# Patient Record
Sex: MALE | Race: OTHER | Employment: FULL TIME | URBAN - METROPOLITAN AREA
[De-identification: names, ages, dates, MRNs, and addresses within clinical notes are randomized per-mention and may not be internally consistent; named-entity substitution may affect disease eponyms.]

---

## 2017-05-02 ENCOUNTER — HOSPITAL ENCOUNTER (INPATIENT)
Age: 47
LOS: 1 days | Discharge: HOME | DRG: 103 | End: 2017-05-03
Attending: EMERGENCY MEDICINE | Admitting: HOSPITALIST
Payer: COMMERCIAL

## 2017-05-02 ENCOUNTER — APPOINTMENT (OUTPATIENT)
Dept: CT IMAGING | Age: 47
DRG: 103 | End: 2017-05-02
Attending: EMERGENCY MEDICINE
Payer: COMMERCIAL

## 2017-05-02 DIAGNOSIS — R29.898 LEFT ARM WEAKNESS: ICD-10-CM

## 2017-05-02 DIAGNOSIS — R51.9 HEADACHE, UNSPECIFIED HEADACHE TYPE: Primary | ICD-10-CM

## 2017-05-02 LAB
ALBUMIN SERPL BCP-MCNC: 4.2 G/DL (ref 3.5–5)
ALBUMIN/GLOB SERPL: 1.1 {RATIO} (ref 1.2–3.5)
ALP SERPL-CCNC: 92 U/L (ref 50–136)
ALT SERPL-CCNC: 53 U/L (ref 12–65)
ANION GAP BLD CALC-SCNC: 10 MMOL/L (ref 7–16)
AST SERPL W P-5'-P-CCNC: 35 U/L (ref 15–37)
BASOPHILS # BLD AUTO: 0.1 K/UL (ref 0–0.2)
BASOPHILS # BLD: 2 % (ref 0–2)
BILIRUB SERPL-MCNC: 0.7 MG/DL (ref 0.2–1.1)
BUN SERPL-MCNC: 16 MG/DL (ref 6–23)
CALCIUM SERPL-MCNC: 9 MG/DL (ref 8.3–10.4)
CHLORIDE SERPL-SCNC: 104 MMOL/L (ref 98–107)
CO2 SERPL-SCNC: 29 MMOL/L (ref 21–32)
CREAT SERPL-MCNC: 0.96 MG/DL (ref 0.8–1.5)
DIFFERENTIAL METHOD BLD: ABNORMAL
EOSINOPHIL # BLD: 0.2 K/UL (ref 0–0.8)
EOSINOPHIL NFR BLD: 2 % (ref 0.5–7.8)
ERYTHROCYTE [DISTWIDTH] IN BLOOD BY AUTOMATED COUNT: 12.8 % (ref 11.9–14.6)
ETHANOL SERPL-MCNC: <3 MG/DL
GLOBULIN SER CALC-MCNC: 3.7 G/DL (ref 2.3–3.5)
GLUCOSE SERPL-MCNC: 93 MG/DL (ref 65–100)
HCT VFR BLD AUTO: 44.6 % (ref 41.1–50.3)
HGB BLD-MCNC: 15.8 G/DL (ref 13.6–17.2)
IMM GRANULOCYTES # BLD: 0.1 K/UL (ref 0–0.5)
IMM GRANULOCYTES NFR BLD AUTO: 0.6 % (ref 0–5)
LYMPHOCYTES # BLD AUTO: 52 % (ref 13–44)
LYMPHOCYTES # BLD: 4.2 K/UL (ref 0.5–4.6)
MCH RBC QN AUTO: 30.7 PG (ref 26.1–32.9)
MCHC RBC AUTO-ENTMCNC: 35.4 G/DL (ref 31.4–35)
MCV RBC AUTO: 86.6 FL (ref 79.6–97.8)
MONOCYTES # BLD: 0.5 K/UL (ref 0.1–1.3)
MONOCYTES NFR BLD AUTO: 7 % (ref 4–12)
NEUTS SEG # BLD: 2.9 K/UL (ref 1.7–8.2)
NEUTS SEG NFR BLD AUTO: 36 % (ref 43–78)
PLATELET # BLD AUTO: 281 K/UL (ref 150–450)
PMV BLD AUTO: 9.5 FL (ref 10.8–14.1)
POTASSIUM SERPL-SCNC: 3.6 MMOL/L (ref 3.5–5.1)
PROT SERPL-MCNC: 7.9 G/DL (ref 6.3–8.2)
RBC # BLD AUTO: 5.15 M/UL (ref 4.23–5.67)
SODIUM SERPL-SCNC: 143 MMOL/L (ref 136–145)
TROPONIN I SERPL-MCNC: <0.02 NG/ML (ref 0.02–0.05)
WBC # BLD AUTO: 7.9 K/UL (ref 4.3–11.1)

## 2017-05-02 PROCEDURE — 74011250636 HC RX REV CODE- 250/636: Performed by: EMERGENCY MEDICINE

## 2017-05-02 PROCEDURE — 80307 DRUG TEST PRSMV CHEM ANLYZR: CPT | Performed by: EMERGENCY MEDICINE

## 2017-05-02 PROCEDURE — 81003 URINALYSIS AUTO W/O SCOPE: CPT | Performed by: EMERGENCY MEDICINE

## 2017-05-02 PROCEDURE — 96361 HYDRATE IV INFUSION ADD-ON: CPT | Performed by: EMERGENCY MEDICINE

## 2017-05-02 PROCEDURE — 85025 COMPLETE CBC W/AUTO DIFF WBC: CPT | Performed by: EMERGENCY MEDICINE

## 2017-05-02 PROCEDURE — 84484 ASSAY OF TROPONIN QUANT: CPT | Performed by: EMERGENCY MEDICINE

## 2017-05-02 PROCEDURE — 93005 ELECTROCARDIOGRAM TRACING: CPT | Performed by: EMERGENCY MEDICINE

## 2017-05-02 PROCEDURE — 70450 CT HEAD/BRAIN W/O DYE: CPT

## 2017-05-02 PROCEDURE — 80053 COMPREHEN METABOLIC PANEL: CPT | Performed by: EMERGENCY MEDICINE

## 2017-05-02 PROCEDURE — 96374 THER/PROPH/DIAG INJ IV PUSH: CPT | Performed by: EMERGENCY MEDICINE

## 2017-05-02 PROCEDURE — 99284 EMERGENCY DEPT VISIT MOD MDM: CPT | Performed by: EMERGENCY MEDICINE

## 2017-05-02 RX ORDER — HYDROMORPHONE HYDROCHLORIDE 1 MG/ML
0.5 INJECTION, SOLUTION INTRAMUSCULAR; INTRAVENOUS; SUBCUTANEOUS
Status: COMPLETED | OUTPATIENT
Start: 2017-05-02 | End: 2017-05-02

## 2017-05-02 RX ADMIN — HYDROMORPHONE HYDROCHLORIDE 0.5 MG: 1 INJECTION, SOLUTION INTRAMUSCULAR; INTRAVENOUS; SUBCUTANEOUS at 22:20

## 2017-05-02 RX ADMIN — SODIUM CHLORIDE 1000 ML: 900 INJECTION, SOLUTION INTRAVENOUS at 22:20

## 2017-05-02 NOTE — IP AVS SNAPSHOT
Current Discharge Medication List  
  
START taking these medications Dose & Instructions Dispensing Information Comments Morning Noon Evening Bedtime  
 atorvastatin 40 mg tablet Commonly known as:  LIPITOR Your next dose is:  5/3 Dose:  40 mg Take 1 Tab by mouth nightly. Quantity:  30 Tab Refills:  1  
     
   
   
   
  
 levothyroxine 50 mcg tablet Commonly known as:  SYNTHROID Start taking on:  5/4/2017 Dose:  50 mcg Take 1 Tab by mouth Daily (before breakfast). Quantity:  30 Tab Refills:  1  
     
  
   
   
   
  
 metaxalone 800 mg tablet Commonly known as:  SKELAXIN Dose:  800 mg Take 1 Tab by mouth three (3) times daily as needed for up to 10 days. Indications: MUSCLE SPASM Quantity:  60 Tab Refills:  1 CONTINUE these medications which have NOT CHANGED Dose & Instructions Dispensing Information Comments Morning Noon Evening Bedtime  
 ibuprofen 100 mg tablet Dose:  100 mg Take 100 mg by mouth every six (6) hours as needed for Pain. Refills:  0  
     
   
   
   
  
 TYLENOL 325 mg tablet Generic drug:  acetaminophen Dose:  325 mg Take 325 mg by mouth every four (4) hours as needed for Pain. Refills:  0 Where to Get Your Medications Information on where to get these meds will be given to you by the nurse or doctor. ! Ask your nurse or doctor about these medications  
  atorvastatin 40 mg tablet  
 levothyroxine 50 mcg tablet  
 metaxalone 800 mg tablet

## 2017-05-02 NOTE — IP AVS SNAPSHOT
[address] 
[phone] Patient: [facility] MRN: [id number]:[id number] You are allergic to the following No active allergies Recent Documentation Height Weight BMI  
  
  
 1.626 m 79.4 kg 30.04 kg/m2 Emergency Contacts Name Discharge Info Relation Home Work Mobile [facility]   [phone] About your hospitalization You were admitted on:  May 3, 2017 You last received care in the:  [facility] 8 MED SURG You were discharged on:  May 3, 2017 Unit phone number:  [phone] Why you were hospitalized Your primary diagnosis was:  Headache Your diagnoses also included:  Blurred Vision, Bilateral, Neck Pain Providers Seen During Your Hospitalizations Provider Role Specialty Primary office phone Jackie Wang MD Attending Provider Emergency Medicine [phone] Samuel Tellez MD Attending Provider Internal Medicine [phone] Your Primary Care Physician (PCP) Primary Care Physician Office Phone Office Fax NONE ** None ** ** None ** Follow-up Information Follow up With Details Comments Contact Info Cyrus Favre, NP On 5/17/2017 May 17 at 10:10am [address] [facility] [address] [facility] [address] [phone] None   None ([phone]) Patient stated that they have no PCP Your Appointments Wednesday May 17, 2017 10:40 AM EDT New Patient with Cyrus Favre, NP  
[address] [place] ([address]) [address] [place] [address]  
[phone] Current Discharge Medication List  
  
START taking these medications Dose & Instructions Dispensing Information Comments Morning Noon Evening Bedtime  
 atorvastatin 40 mg tablet Commonly known as:  LIPITOR Your next dose is:  5/3  Dose:  40 mg  
 Take 1 Tab by mouth nightly. Quantity:  30 Tab Refills:  1  
     
   
   
   
  
 levothyroxine 50 mcg tablet Commonly known as:  SYNTHROID Start taking on:  5/4/2017 Dose:  50 mcg Take 1 Tab by mouth Daily (before breakfast). Quantity:  30 Tab Refills:  1  
     
  
   
   
   
  
 metaxalone 800 mg tablet Commonly known as:  SKELAXIN Dose:  800 mg Take 1 Tab by mouth three (3) times daily as needed for up to 10 days. Indications: MUSCLE SPASM Quantity:  60 Tab Refills:  1 CONTINUE these medications which have NOT CHANGED Dose & Instructions Dispensing Information Comments Morning Noon Evening Bedtime  
 ibuprofen 100 mg tablet Dose:  100 mg Take 100 mg by mouth every six (6) hours as needed for Pain. Refills:  0  
     
   
   
   
  
 TYLENOL 325 mg tablet Generic drug:  acetaminophen Dose:  325 mg Take 325 mg by mouth every four (4) hours as needed for Pain. Refills:  0 Where to Get Your Medications Information on where to get these meds will be given to you by the nurse or doctor. ! Ask your nurse or doctor about these medications  
  atorvastatin 40 mg tablet  
 levothyroxine 50 mcg tablet  
 metaxalone 800 mg tablet Discharge Instructions DISCHARGE SUMMARY from Nurse The following personal items are in your possession at time of discharge: 
 
Dental Appliances: None Visual Aid: None (bifocals at home that do not help) Home Medications: None Jewelry: Watch Clothing: Pants, Shirt, Footwear, Socks, Undergarments Other Valuables: Cell Phone, Seretha Gone (2 cell phones) PATIENT INSTRUCTIONS: 
 
 
F-face looks uneven A-arms unable to move or move unevenly S-speech slurred or non-existent T-time-call 911 as soon as signs and symptoms begin-DO NOT go Back to bed or wait to see if you get better-TIME IS BRAIN. Warning Signs of HEART ATTACK Call 911 if you have these symptoms: 
? Chest discomfort. Most heart attacks involve discomfort in the center of the chest that lasts more than a few minutes, or that goes away and comes back. It can feel like uncomfortable pressure, squeezing, fullness, or pain. ? Discomfort in other areas of the upper body. Symptoms can include pain or discomfort in one or both arms, the back, neck, jaw, or stomach. ? Shortness of breath with or without chest discomfort. ? Other signs may include breaking out in a cold sweat, nausea, or lightheadedness. Don't wait more than five minutes to call [address]! Fast action can save your life. Calling 911 is almost always the fastest way to get lifesaving treatment. Emergency Medical Services staff can begin treatment when they arrive  up to an hour sooner than if someone gets to the hospital by car. The discharge information has been reviewed with the patient. The patient verbalized understanding. Discharge medications reviewed with the patient and appropriate educational materials and side effects teaching were provided. Back Pain: Care Instructions Your Care Instructions Back pain has many possible causes. It is often related to problems with muscles and ligaments of the back. It may also be related to problems with the nerves, discs, or bones of the back. Moving, lifting, standing, sitting, or sleeping in an awkward way can strain the back. Sometimes you don't notice the injury until later. Arthritis is another common cause of back pain.  
Although it may hurt a lot, back pain usually improves on its own within several weeks. Most people recover in 12 weeks or less. Using good home treatment and being careful not to stress your back can help you feel better sooner. Follow-up care is a key part of your treatment and safety. Be sure to make and go to all appointments, and call your doctor if you are having problems. Its also a good idea to know your test results and keep a list of the medicines you take. How can you care for yourself at home? · Sit or lie in positions that are most comfortable and reduce your pain. Try one of these positions when you lie down: ¨ Lie on your back with your knees bent and supported by large pillows. ¨ Lie on the floor with your legs on the seat of a sofa or chair. Donnise Falter on your side with your knees and hips bent and a pillow between your legs. ¨ Lie on your stomach if it does not make pain worse. · Do not sit up in bed, and avoid soft couches and twisted positions. Bed rest can help relieve pain at first, but it delays healing. Avoid bed rest after the first day of back pain. · Change positions every 30 minutes. If you must sit for long periods of time, take breaks from sitting. Get up and walk around, or lie in a comfortable position. · Try using a heating pad on a low or medium setting for 15 to 20 minutes every 2 or 3 hours. Try a warm shower in place of one session with the heating pad. · You can also try an ice pack for 10 to 15 minutes every 2 to 3 hours. Put a thin cloth between the ice pack and your skin. · Take pain medicines exactly as directed. ¨ If the doctor gave you a prescription medicine for pain, take it as prescribed. ¨ If you are not taking a prescription pain medicine, ask your doctor if you can take an over-the-counter medicine. · Take short walks several times a day. You can start with 5 to 10 minutes, 3 or 4 times a day, and work up to longer walks. Walk on level surfaces and avoid hills and stairs until your back is better. · Return to work and other activities as soon as you can. Continued rest without activity is usually not good for your back. · To prevent future back pain, do exercises to stretch and strengthen your back and stomach. Learn how to use good posture, safe lifting techniques, and proper body mechanics. When should you call for help? Call your doctor now or seek immediate medical care if: 
· You have new or worsening numbness in your legs. · You have new or worsening weakness in your legs. (This could make it hard to stand up.) · You lose control of your bladder or bowels. Watch closely for changes in your health, and be sure to contact your doctor if: 
· Your pain gets worse. · You are not getting better after 2 weeks. Where can you learn more? Go to http://sahra-shekhar.info/. Enter H788 in the search box to learn more about \"Back Pain: Care Instructions. \" Current as of: May 23, 2016 Content Version: 11.2 © 1238-9886 [employer]. Care instructions adapted under license by [employer] (which disclaims liability or warranty for this information). If you have questions about a medical condition or this instruction, always ask your healthcare professional. [address] any warranty or liability for your use of this information. Hypothyroidism: Care Instructions Your Care Instructions You have hypothyroidism, which means that your body is not making enough thyroid hormone. This hormone helps your body use energy. If your thyroid level is low, you may feel tired, be constipated, have an increase in your blood pressure, or have dry skin or memory problems. You may also get cold easily, even when it is warm. Women with low thyroid levels may have heavy menstrual periods. A blood test to find your thyroid-stimulating hormone (TSH) level is used to check for hypothyroidism.  A high TSH level may mean that you have low thyroid. When your body is not making enough thyroid hormone, TSH levels rise in an effort to make the body produce more. The treatment for hypothyroidism is to take thyroid hormone pills. You should start to feel better in 1 to 2 weeks. But it can take several months to see changes in the TSH level. You will need regular visits with your doctor to make sure you have the right dose of medicine. Most people need treatment for the rest of their lives. You will need to see your doctor regularly to have blood tests and to make sure you are doing well. Follow-up care is a key part of your treatment and safety. Be sure to make and go to all appointments, and call your doctor if you are having problems. Its also a good idea to know your test results and keep a list of the medicines you take. How can you care for yourself at home? · Take your thyroid hormone medicine exactly as prescribed. Call your doctor if you think you are having a problem with your medicine. Most people do not have side effects if they take the right amount of medicine regularly. ¨ Take the medicine 30 minutes before breakfast, and do not take it with calcium, vitamins, or iron. ¨ Do not take extra doses of your thyroid medicine. It will not help you get better any faster, and it may cause side effects. ¨ If you forget to take a dose, do NOT take a double dose of medicine. Take your usual dose the next day. · Tell your doctor about all prescription, herbal, or over-the-counter products you take. · Take care of yourself. Eat a healthy diet, get enough sleep, and get regular exercise. When should you call for help? Call 911 anytime you think you may need emergency care. For example, call if: 
· You passed out (lost consciousness). · You have severe trouble breathing. · You have a very slow heartbeat (less than 60 beats a minute). · You have a low body temperature (95°F or below). Call your doctor now or seek immediate medical care if: · You feel tired, sluggish, or weak. · You have trouble remembering things or concentrating. · You do not begin to feel better 2 weeks after starting your medicine. Watch closely for changes in your health, and be sure to contact your doctor if you have any problems. Where can you learn more? Go to http://sahra-shekhar.info/. Enter A367 in the search box to learn more about \"Hypothyroidism: Care Instructions. \" Current as of: July 28, 2016 Content Version: 11.2 © 6538-8304 [employer]. Care instructions adapted under license by [employer] (which disclaims liability or warranty for this information). If you have questions about a medical condition or this instruction, always ask your healthcare professional. [address] any warranty or liability for your use of this information. Discharge Orders None [employer] Announcement We are excited to announce that we are making your provider's discharge notes available to you in [employer]. You will see these notes when they are completed and signed by the physician that discharged you from your recent hospital stay. If you have any questions or concerns about any information you see in [employer], please call the Health Information Department where you were seen or reach out to your Primary Care Provider for more information about your plan of care. Introducing [facility]! [employer] introduces [employer] patient portal. Now you can access parts of your medical record, email your doctor's office, and request medication refills online. 1. In your internet browser, go to [email or web address] 2. Click on the First Time User? Click Here link in the Sign In box. You will see the New Member Sign Up page. 3. Enter your [employer] Access Code exactly as it appears below. You will not need to use this code after youve completed the sign-up process.  If you do not sign up before the expiration date, you must request a new code. · [employer] Access Code: [email or web address] Expires: 7/31/2017  8:27 PM 
 
4. Enter the last four digits of your Social Security Number (xxxx) and Date of Birth (mm/dd/yyyy) as indicated and click Submit. You will be taken to the next sign-up page. 5. Create a [employer] ID. This will be your [employer] login ID and cannot be changed, so think of one that is secure and easy to remember. 6. Create a [employer] password. You can change your password at any time. 7. Enter your Password Reset Question and Answer. This can be used at a later time if you forget your password. 8. Enter your e-mail address. You will receive e-mail notification when new information is available in [address] 9. Click Sign Up. You can now view and download portions of your medical record. 10. Click the Download Summary menu link to download a portable copy of your medical information. If you have questions, please visit the Frequently Asked Questions section of the [employer] website. Remember, [employer] is NOT to be used for urgent needs. For medical emergencies, dial 911. Now available from your iPhone and Android! General Information Please provide this summary of care documentation to your next provider. Patient Signature:  ____________________________________________________________ Date:  ____________________________________________________________  
  
Yanet Burn Provider Signature:  ____________________________________________________________ Date:  ____________________________________________________________

## 2017-05-03 ENCOUNTER — APPOINTMENT (OUTPATIENT)
Dept: GENERAL RADIOLOGY | Age: 47
DRG: 103 | End: 2017-05-03
Attending: HOSPITALIST
Payer: COMMERCIAL

## 2017-05-03 ENCOUNTER — APPOINTMENT (OUTPATIENT)
Dept: CT IMAGING | Age: 47
DRG: 103 | End: 2017-05-03
Attending: HOSPITALIST
Payer: COMMERCIAL

## 2017-05-03 ENCOUNTER — APPOINTMENT (OUTPATIENT)
Dept: MRI IMAGING | Age: 47
DRG: 103 | End: 2017-05-03
Attending: HOSPITALIST
Payer: COMMERCIAL

## 2017-05-03 VITALS
TEMPERATURE: 98.1 F | BODY MASS INDEX: 29.88 KG/M2 | SYSTOLIC BLOOD PRESSURE: 123 MMHG | HEIGHT: 64 IN | DIASTOLIC BLOOD PRESSURE: 77 MMHG | RESPIRATION RATE: 18 BRPM | HEART RATE: 57 BPM | OXYGEN SATURATION: 98 % | WEIGHT: 175 LBS

## 2017-05-03 PROBLEM — M54.2 NECK PAIN: Status: ACTIVE | Noted: 2017-05-03

## 2017-05-03 PROBLEM — H53.8 BLURRED VISION, BILATERAL: Status: ACTIVE | Noted: 2017-05-03

## 2017-05-03 PROBLEM — R51.9 HEADACHE: Status: ACTIVE | Noted: 2017-05-03

## 2017-05-03 LAB
AMPHET UR QL SCN: NEGATIVE
ATRIAL RATE: 59 BPM
BARBITURATES UR QL SCN: NEGATIVE
BENZODIAZ UR QL: NEGATIVE
CALCULATED P AXIS, ECG09: 55 DEGREES
CALCULATED R AXIS, ECG10: 66 DEGREES
CALCULATED T AXIS, ECG11: 44 DEGREES
CANNABINOIDS UR QL SCN: NEGATIVE
CHOLEST SERPL-MCNC: 199 MG/DL
COCAINE UR QL SCN: NEGATIVE
DIAGNOSIS, 93000: NORMAL
ERYTHROCYTE [SEDIMENTATION RATE] IN BLOOD: 4 MM/HR (ref 0–15)
EST. AVERAGE GLUCOSE BLD GHB EST-MCNC: 117 MG/DL
HBA1C MFR BLD: 5.7 % (ref 4.8–6)
HDLC SERPL-MCNC: 32 MG/DL (ref 40–60)
HDLC SERPL: 6.2 {RATIO}
HIV1 P24 AG SERPL QL IA: NONREACTIVE
HIV1+2 AB SERPL QL IA: NONREACTIVE
LDLC SERPL CALC-MCNC: 136.4 MG/DL
LIPID PROFILE,FLP: ABNORMAL
MAGNESIUM SERPL-MCNC: 2.1 MG/DL (ref 1.8–2.4)
METHADONE UR QL: NEGATIVE
OPIATES UR QL: POSITIVE
P-R INTERVAL, ECG05: 160 MS
PCP UR QL: NEGATIVE
Q-T INTERVAL, ECG07: 388 MS
QRS DURATION, ECG06: 94 MS
QTC CALCULATION (BEZET), ECG08: 384 MS
T4 FREE SERPL-MCNC: 0.6 NG/DL (ref 0.78–1.46)
TRIGL SERPL-MCNC: 153 MG/DL (ref 35–150)
TSH SERPL DL<=0.005 MIU/L-ACNC: 27.4 UIU/ML (ref 0.36–3.74)
VENTRICULAR RATE, ECG03: 59 BPM
VIT B12 SERPL-MCNC: 430 PG/ML (ref 193–986)
VLDLC SERPL CALC-MCNC: 30.6 MG/DL (ref 6–23)

## 2017-05-03 PROCEDURE — 99218 HC RM OBSERVATION: CPT

## 2017-05-03 PROCEDURE — 70553 MRI BRAIN STEM W/O & W/DYE: CPT

## 2017-05-03 PROCEDURE — 84443 ASSAY THYROID STIM HORMONE: CPT | Performed by: HOSPITALIST

## 2017-05-03 PROCEDURE — 74011636320 HC RX REV CODE- 636/320: Performed by: HOSPITALIST

## 2017-05-03 PROCEDURE — 74011250637 HC RX REV CODE- 250/637: Performed by: HOSPITALIST

## 2017-05-03 PROCEDURE — 82607 VITAMIN B-12: CPT | Performed by: HOSPITALIST

## 2017-05-03 PROCEDURE — 65270000029 HC RM PRIVATE

## 2017-05-03 PROCEDURE — 36415 COLL VENOUS BLD VENIPUNCTURE: CPT | Performed by: HOSPITALIST

## 2017-05-03 PROCEDURE — 97530 THERAPEUTIC ACTIVITIES: CPT

## 2017-05-03 PROCEDURE — 84439 ASSAY OF FREE THYROXINE: CPT | Performed by: INTERNAL MEDICINE

## 2017-05-03 PROCEDURE — 74011000258 HC RX REV CODE- 258: Performed by: HOSPITALIST

## 2017-05-03 PROCEDURE — 87389 HIV-1 AG W/HIV-1&-2 AB AG IA: CPT | Performed by: HOSPITALIST

## 2017-05-03 PROCEDURE — 85652 RBC SED RATE AUTOMATED: CPT | Performed by: HOSPITALIST

## 2017-05-03 PROCEDURE — 80061 LIPID PANEL: CPT | Performed by: HOSPITALIST

## 2017-05-03 PROCEDURE — 83036 HEMOGLOBIN GLYCOSYLATED A1C: CPT | Performed by: HOSPITALIST

## 2017-05-03 PROCEDURE — 74011250636 HC RX REV CODE- 250/636: Performed by: HOSPITALIST

## 2017-05-03 PROCEDURE — 72050 X-RAY EXAM NECK SPINE 4/5VWS: CPT

## 2017-05-03 PROCEDURE — 97161 PT EVAL LOW COMPLEX 20 MIN: CPT

## 2017-05-03 PROCEDURE — 83735 ASSAY OF MAGNESIUM: CPT | Performed by: HOSPITALIST

## 2017-05-03 PROCEDURE — 70496 CT ANGIOGRAPHY HEAD: CPT

## 2017-05-03 PROCEDURE — A9577 INJ MULTIHANCE: HCPCS | Performed by: HOSPITALIST

## 2017-05-03 RX ORDER — METAXALONE 800 MG/1
800 TABLET ORAL
Qty: 60 TAB | Refills: 1 | Status: SHIPPED | OUTPATIENT
Start: 2017-05-03 | End: 2017-05-13

## 2017-05-03 RX ORDER — SODIUM CHLORIDE 9 MG/ML
100 INJECTION, SOLUTION INTRAVENOUS CONTINUOUS
Status: DISPENSED | OUTPATIENT
Start: 2017-05-03 | End: 2017-05-03

## 2017-05-03 RX ORDER — SODIUM CHLORIDE 0.9 % (FLUSH) 0.9 %
10 SYRINGE (ML) INJECTION
Status: COMPLETED | OUTPATIENT
Start: 2017-05-03 | End: 2017-05-03

## 2017-05-03 RX ORDER — ATORVASTATIN CALCIUM 40 MG/1
40 TABLET, FILM COATED ORAL
Qty: 30 TAB | Refills: 1 | Status: SHIPPED | OUTPATIENT
Start: 2017-05-03 | End: 2017-06-07

## 2017-05-03 RX ORDER — BISACODYL 5 MG
5 TABLET, DELAYED RELEASE (ENTERIC COATED) ORAL DAILY PRN
Status: DISCONTINUED | OUTPATIENT
Start: 2017-05-03 | End: 2017-05-03 | Stop reason: HOSPADM

## 2017-05-03 RX ORDER — ATORVASTATIN CALCIUM 40 MG/1
40 TABLET, FILM COATED ORAL
Status: DISCONTINUED | OUTPATIENT
Start: 2017-05-03 | End: 2017-05-03 | Stop reason: HOSPADM

## 2017-05-03 RX ORDER — ONDANSETRON 2 MG/ML
4 INJECTION INTRAMUSCULAR; INTRAVENOUS
Status: DISCONTINUED | OUTPATIENT
Start: 2017-05-03 | End: 2017-05-03 | Stop reason: HOSPADM

## 2017-05-03 RX ORDER — ENOXAPARIN SODIUM 100 MG/ML
40 INJECTION SUBCUTANEOUS EVERY 24 HOURS
Status: DISCONTINUED | OUTPATIENT
Start: 2017-05-03 | End: 2017-05-03 | Stop reason: HOSPADM

## 2017-05-03 RX ORDER — KETOROLAC TROMETHAMINE 15 MG/ML
15 INJECTION, SOLUTION INTRAMUSCULAR; INTRAVENOUS
Status: DISCONTINUED | OUTPATIENT
Start: 2017-05-03 | End: 2017-05-03 | Stop reason: HOSPADM

## 2017-05-03 RX ORDER — ASPIRIN 325 MG
325 TABLET ORAL DAILY
Status: DISCONTINUED | OUTPATIENT
Start: 2017-05-03 | End: 2017-05-03 | Stop reason: HOSPADM

## 2017-05-03 RX ORDER — ENOXAPARIN SODIUM 100 MG/ML
40 INJECTION SUBCUTANEOUS EVERY 24 HOURS
Status: DISCONTINUED | OUTPATIENT
Start: 2017-05-03 | End: 2017-05-03 | Stop reason: SDUPTHER

## 2017-05-03 RX ORDER — ACETAMINOPHEN 325 MG/1
650 TABLET ORAL
Status: DISCONTINUED | OUTPATIENT
Start: 2017-05-03 | End: 2017-05-03 | Stop reason: HOSPADM

## 2017-05-03 RX ORDER — SODIUM CHLORIDE 0.9 % (FLUSH) 0.9 %
10 SYRINGE (ML) INJECTION
Status: DISCONTINUED | OUTPATIENT
Start: 2017-05-03 | End: 2017-05-03 | Stop reason: HOSPADM

## 2017-05-03 RX ORDER — ACETAMINOPHEN 325 MG/1
325 TABLET ORAL
COMMUNITY
End: 2017-06-19

## 2017-05-03 RX ORDER — LEVOTHYROXINE SODIUM 50 UG/1
50 TABLET ORAL
Qty: 30 TAB | Refills: 1 | Status: SHIPPED | OUTPATIENT
Start: 2017-05-04 | End: 2017-05-18 | Stop reason: DRUGHIGH

## 2017-05-03 RX ORDER — METAXALONE 800 MG/1
800 TABLET ORAL
Status: DISCONTINUED | OUTPATIENT
Start: 2017-05-03 | End: 2017-05-03 | Stop reason: HOSPADM

## 2017-05-03 RX ORDER — LEVOTHYROXINE SODIUM 50 UG/1
50 TABLET ORAL
Status: DISCONTINUED | OUTPATIENT
Start: 2017-05-04 | End: 2017-05-03 | Stop reason: HOSPADM

## 2017-05-03 RX ORDER — SODIUM CHLORIDE 0.9 % (FLUSH) 0.9 %
5-10 SYRINGE (ML) INJECTION AS NEEDED
Status: DISCONTINUED | OUTPATIENT
Start: 2017-05-03 | End: 2017-05-03 | Stop reason: HOSPADM

## 2017-05-03 RX ORDER — SODIUM CHLORIDE 0.9 % (FLUSH) 0.9 %
5-10 SYRINGE (ML) INJECTION EVERY 8 HOURS
Status: DISCONTINUED | OUTPATIENT
Start: 2017-05-03 | End: 2017-05-03 | Stop reason: HOSPADM

## 2017-05-03 RX ADMIN — GADOBENATE DIMEGLUMINE 16 ML: 529 INJECTION, SOLUTION INTRAVENOUS at 14:43

## 2017-05-03 RX ADMIN — Medication 5 ML: at 16:26

## 2017-05-03 RX ADMIN — ENOXAPARIN SODIUM 40 MG: 40 INJECTION SUBCUTANEOUS at 08:50

## 2017-05-03 RX ADMIN — IOPAMIDOL 80 ML: 755 INJECTION, SOLUTION INTRAVENOUS at 14:53

## 2017-05-03 RX ADMIN — ASPIRIN 325 MG ORAL TABLET 325 MG: 325 PILL ORAL at 08:50

## 2017-05-03 RX ADMIN — KETOROLAC TROMETHAMINE 15 MG: 15 INJECTION, SOLUTION INTRAMUSCULAR; INTRAVENOUS at 03:23

## 2017-05-03 RX ADMIN — Medication 10 ML: at 14:53

## 2017-05-03 RX ADMIN — METAXALONE 800 MG: 800 TABLET ORAL at 03:23

## 2017-05-03 RX ADMIN — Medication 5 ML: at 03:24

## 2017-05-03 RX ADMIN — SODIUM CHLORIDE 100 ML: 900 INJECTION, SOLUTION INTRAVENOUS at 14:53

## 2017-05-03 RX ADMIN — SODIUM CHLORIDE 100 ML/HR: 900 INJECTION, SOLUTION INTRAVENOUS at 03:27

## 2017-05-03 RX ADMIN — ATORVASTATIN CALCIUM 40 MG: 40 TABLET, FILM COATED ORAL at 03:23

## 2017-05-03 RX ADMIN — SODIUM CHLORIDE 100 ML/HR: 900 INJECTION, SOLUTION INTRAVENOUS at 12:06

## 2017-05-03 NOTE — H&P
HOSPITALIST H&P/CONSULT  NAME:  Deejay Jasmine [facility]   Age:  49 y.o.  :   1970   MRN:   [id number]  PCP: None  Treatment Team: Attending Provider: Howard Kilpatrick MD; Primary Nurse: Alice Maldonado, MICHAEL    No Order     CC: Reason for admission is: headache and blurred vision    HPI:   Patient case was reviewed with the ER provider prior to seeing the patient. Patient is a 49 y.o. male who presents to the ER with blurred vision headache and left arm weakness.  present. No recent injury but does have previous head trauma at work when fell and hit head without a hard hat. He had neurosurgical consultation at that time and no acute pathology. Denies syncope, chest pain, palpitations, F/C. Has had left shoulder surgery before. No recent heavy lifting. States this HA is worse than ones he has had in the past but denies worst HA ever. No hx TIA/CVA or seizures. ROS:  All systems have been reviewed and are negative except as stated in HPI or elsewhere. No past medical history on file. No past surgical history on file. Social History   Substance Use Topics    Smoking status: Not on file    Smokeless tobacco: Not on file    Alcohol use Not on file      No family history on file. FH Reviewed and non-contributory to admitting diagnosis    No Known Allergies   None         Objective:     Visit Vitals    /81    Pulse (!) 56    Temp 98.4 °F (36.9 °C)    Resp 12    SpO2 95%      Temp (24hrs), Av.4 °F (36.9 °C), Min:98.4 °F (36.9 °C), Max:98.4 °F (36.9 °C)    Oxygen Therapy  O2 Sat (%): 95 % (17 0000)  Pulse via Oximetry: 56 beats per minute (17 0000)  O2 Device: Room air (17)   There is no height or weight on file to calculate BMI. Physical Exam:    General:    WD and WN , No apparent distress. Head:   Normocephalic, without obvious abnormality, atraumatic.   Eyes:  PERRL; EOMI sclerae injected  No nystagmus  ENT:  Hearing is normal.  Oropharynx is clear with tacky mucous membranes   Resp:    Clear to auscultation bilaterally. No Wheezing or Rhonchi. Resp are even and unlabored  Heart[de-identified]  Regular rate and rhythm,  no murmur, rub or gallop. No LE edema  Abdomen:   Soft, non-tender. Not distended. Bowel sounds normal.  Musc/SK: Muscle strength and tone normal and appropriate for age and condition. No cyanosis. No clubbing. Tender left trapezius and rhomboid  Skin:     Texture, turgor normal. No significant rashes or lesions. Neurologic: CN II - XII are tested and intact; left arm diminished strength compared to right. Slow inaccurate finger to nose. Did not assess gait   Psych: Alert and oriented x 4;  Judgement and insight are normal     Data Review:   Recent Results (from the past 24 hour(s))   CBC WITH AUTOMATED DIFF    Collection Time: 05/02/17  8:46 PM   Result Value Ref Range    WBC 7.9 4.3 - 11.1 K/uL    RBC 5.15 4.23 - 5.67 M/uL    HGB 15.8 13.6 - 17.2 g/dL    HCT 44.6 41.1 - 50.3 %    MCV 86.6 79.6 - 97.8 FL    MCH 30.7 26.1 - 32.9 PG    MCHC 35.4 (H) 31.4 - 35.0 g/dL    RDW 12.8 11.9 - 14.6 %    PLATELET 098 048 - 143 K/uL    MPV 9.5 (L) 10.8 - 14.1 FL    DF AUTOMATED      NEUTROPHILS 36 (L) 43 - 78 %    LYMPHOCYTES 52 (H) 13 - 44 %    MONOCYTES 7 4.0 - 12.0 %    EOSINOPHILS 2 0.5 - 7.8 %    BASOPHILS 2 0.0 - 2.0 %    IMMATURE GRANULOCYTES 0.6 0.0 - 5.0 %    ABS. NEUTROPHILS 2.9 1.7 - 8.2 K/UL    ABS. LYMPHOCYTES 4.2 0.5 - 4.6 K/UL    ABS. MONOCYTES 0.5 0.1 - 1.3 K/UL    ABS. EOSINOPHILS 0.2 0.0 - 0.8 K/UL    ABS. BASOPHILS 0.1 0.0 - 0.2 K/UL    ABS. IMM.  GRANS. 0.1 0.0 - 0.5 K/UL   METABOLIC PANEL, COMPREHENSIVE    Collection Time: 05/02/17  8:46 PM   Result Value Ref Range    Sodium 143 136 - 145 mmol/L    Potassium 3.6 3.5 - 5.1 mmol/L    Chloride 104 98 - 107 mmol/L    CO2 29 21 - 32 mmol/L    Anion gap 10 7 - 16 mmol/L    Glucose 93 65 - 100 mg/dL    BUN 16 6 - 23 MG/DL    Creatinine 0.96 0.8 - 1.5 MG/DL    GFR est AA >60 >60 ml/min/1.73m2    GFR est non-AA >60 >60 ml/min/1.73m2    Calcium 9.0 8.3 - 10.4 MG/DL    Bilirubin, total 0.7 0.2 - 1.1 MG/DL    ALT (SGPT) 53 12 - 65 U/L    AST (SGOT) 35 15 - 37 U/L    Alk. phosphatase 92 50 - 136 U/L    Protein, total 7.9 6.3 - 8.2 g/dL    Albumin 4.2 3.5 - 5.0 g/dL    Globulin 3.7 (H) 2.3 - 3.5 g/dL    A-G Ratio 1.1 (L) 1.2 - 3.5     TROPONIN I    Collection Time: 05/02/17  8:46 PM   Result Value Ref Range    Troponin-I, Qt. <0.02 (L) 0.02 - 0.05 NG/ML   EKG, 12 LEAD, INITIAL    Collection Time: 05/02/17 10:00 PM   Result Value Ref Range    Ventricular Rate 59 BPM    Atrial Rate 59 BPM    P-R Interval 160 ms    QRS Duration 94 ms    Q-T Interval 388 ms    QTC Calculation (Bezet) 384 ms    Calculated P Axis 55 degrees    Calculated R Axis 66 degrees    Calculated T Axis 44 degrees    Diagnosis       !! AGE AND GENDER SPECIFIC ECG ANALYSIS !! Sinus bradycardia  Otherwise normal ECG     ETHYL ALCOHOL    Collection Time: 05/02/17 10:23 PM   Result Value Ref Range    ALCOHOL(ETHYL),SERUM <3 MG/DL   DRUG SCREEN, URINE    Collection Time: 05/02/17 11:42 PM   Result Value Ref Range    PCP(PHENCYCLIDINE) NEGATIVE       BENZODIAZEPINE NEGATIVE       COCAINE NEGATIVE       AMPHETAMINE NEGATIVE       METHADONE NEGATIVE       THC (TH-CANNABINOL) NEGATIVE       OPIATES POSITIVE      BARBITURATES NEGATIVE        CXR Results  (Last 48 hours)    None        CT Results  (Last 48 hours)               05/02/17 2119  CT HEAD WO CONT Final result    Impression:  Impression: Unremarkable unenhanced CT scan of the brain. Narrative:  CT head without contrast: 05/02/2017       History: Headache, blurred vision.        Technique: 5mm axial images were obtained from the skull base to the vertex   without intravenous contrast.  Radiation dose reduction techniques were used for   this study:  Our CT scanners use one or all of the following: Automated exposure   control, adjustment of the mA and/or kVp according to patient's size, iterative   reconstruction. Comparison: None       Findings: The ventricles and sulci are normal in size and configuration. There   are no extra-axial fluid collections. There is no evidence to suggest an acute   major territorial infarct. There is no evidence of acute intracranial hemorrhage   or mass effect. The bony calvarium is intact. The visualized mastoid air cells   and paranasal sinuses are well pneumatized and aerated. Assessment and Plan: Active Hospital Problems    Diagnosis Date Noted    Headache 05/03/2017    Blurred vision, bilateral 05/03/2017    Neck pain 05/03/2017         · PLAN   · 23 hr observation  · I do not feel this is a TIA or CVA but will obtain MRI imaging in am to r/o any sequelae related to a prior head trauma although neurosurgical evaluation at [facility] 2 years ago was unrevealing  · He has reproducible tenderness to left trapezius and rhomboid which could be associated with cervical disc disease. Will give a trial of toradol and skelaxin to provide symptomatic relief and obtain cervical spine MRI  · Check tsh, B12, sed rate, HIV as possible etiologies for neuropathy  · CTA head and neck  · Empiric statin for now. Check lipids and A1C  · ASA for now. PT eval  · Cont appropriate home meds (see MAR)  · Control symptoms (pain, n/v, fever, etc)  · Monitor appropriate labs   · Plans discussed with patient and/or caregiver; questions answered.     Med records reviewed         Signed By: Tex Knox MD     May 3, 2017

## 2017-05-03 NOTE — PROGRESS NOTES
TRANSFER - IN REPORT:    Verbal report received from MICHAEL North(name) on [facility]  being received from ED(unit) for routine progression of care      Report consisted of patients Situation, Background, Assessment and   Recommendations(SBAR). Information from the following report(s) SBAR, Kardex, ED Summary and MAR was reviewed with the receiving nurse. Opportunity for questions and clarification was provided. Assessment completed upon patients arrival to unit and care assumed.

## 2017-05-03 NOTE — PROGRESS NOTES
Pt. sitting up in chair. Iv fluids infusing. No acute distress noted. Is complaining of left arm tingling off and on. Will monitor.

## 2017-05-03 NOTE — ED PROVIDER NOTES
HPI Comments: Patient is a 50 yo male with headache, blurry vision and left arm weakness. Patient states pain located on the front of his head and consistent with prior headaches however worse than usual and states left arm weakness which is new (states prior pain to his left shoulder from prior surgery, however now weak for two days) and states blurry vision is new. Denies any fevers or chills, no chest pain or SOB, no nausea or vomiting, no further complaints. Patient is a 49 y.o. male presenting with headaches. The history is provided by the patient. No  was used. Headache    Associated symptoms include weakness and dizziness. Pertinent negatives include no fever, no shortness of breath, no nausea and no vomiting. No past medical history on file. No past surgical history on file. No family history on file. Social History     Social History    Marital status: SINGLE     Spouse name: N/A    Number of children: N/A    Years of education: N/A     Occupational History    Not on file. Social History Main Topics    Smoking status: Not on file    Smokeless tobacco: Not on file    Alcohol use Not on file    Drug use: Not on file    Sexual activity: Not on file     Other Topics Concern    Not on file     Social History Narrative         ALLERGIES: Review of patient's allergies indicates no known allergies. Review of Systems   Constitutional: Negative for chills and fever. HENT: Negative for rhinorrhea and sore throat. Eyes: Positive for visual disturbance. Respiratory: Negative for cough and shortness of breath. Cardiovascular: Negative for chest pain and leg swelling. Gastrointestinal: Negative for abdominal pain, diarrhea, nausea and vomiting. Genitourinary: Negative for dysuria. Musculoskeletal: Negative for back pain and neck pain. Skin: Negative for rash. Neurological: Positive for dizziness, weakness and headaches. Psychiatric/Behavioral: The patient is not nervous/anxious. Vitals:    05/02/17 2041 05/02/17 2200 05/02/17 2300   BP: (!) 148/95 (!) 159/95 127/74   Pulse: 77 62 (!) 58   Resp: 20 18 10   Temp: 98.4 °F (36.9 °C)     SpO2: 98% 96% 94%            Physical Exam   Constitutional: He is oriented to person, place, and time. He appears well-developed and well-nourished. HENT:   Head: Normocephalic. Right Ear: External ear normal.   Left Ear: External ear normal.   Eyes: Conjunctivae and EOM are normal. Pupils are equal, round, and reactive to light. Neck: Normal range of motion. Neck supple. No tracheal deviation present. Cardiovascular: Normal rate, regular rhythm, normal heart sounds and intact distal pulses. No murmur heard. Pulmonary/Chest: Effort normal and breath sounds normal. No respiratory distress. Abdominal: Soft. There is no tenderness. Musculoskeletal: Normal range of motion. Neurological: He is alert and oriented to person, place, and time. No cranial nerve deficit. Cn 2-12 appear intact. Left arm with 4/5 weakness compared to right. Extra-ocular movements intact, however difficulty with finger to nose and significant difficulty to stand unassisted and ambulate which he states is new. Skin: No rash noted. Nursing note and vitals reviewed.        MDM  Number of Diagnoses or Management Options  Headache, unspecified headache type: new and requires workup  Left arm weakness: new and requires workup     Amount and/or Complexity of Data Reviewed  Clinical lab tests: ordered and reviewed  Tests in the radiology section of CPT®: ordered and reviewed  Tests in the medicine section of CPT®: ordered and reviewed  Review and summarize past medical records: yes  Discuss the patient with other providers: yes    Risk of Complications, Morbidity, and/or Mortality  Presenting problems: high  Diagnostic procedures: high  Management options: high    Patient Progress  Patient progress: stable    ED Course       Procedures    Recent Results (from the past 12 hour(s))   CBC WITH AUTOMATED DIFF    Collection Time: 05/02/17  8:46 PM   Result Value Ref Range    WBC 7.9 4.3 - 11.1 K/uL    RBC 5.15 4.23 - 5.67 M/uL    HGB 15.8 13.6 - 17.2 g/dL    HCT 44.6 41.1 - 50.3 %    MCV 86.6 79.6 - 97.8 FL    MCH 30.7 26.1 - 32.9 PG    MCHC 35.4 (H) 31.4 - 35.0 g/dL    RDW 12.8 11.9 - 14.6 %    PLATELET 307 814 - 936 K/uL    MPV 9.5 (L) 10.8 - 14.1 FL    DF AUTOMATED      NEUTROPHILS 36 (L) 43 - 78 %    LYMPHOCYTES 52 (H) 13 - 44 %    MONOCYTES 7 4.0 - 12.0 %    EOSINOPHILS 2 0.5 - 7.8 %    BASOPHILS 2 0.0 - 2.0 %    IMMATURE GRANULOCYTES 0.6 0.0 - 5.0 %    ABS. NEUTROPHILS 2.9 1.7 - 8.2 K/UL    ABS. LYMPHOCYTES 4.2 0.5 - 4.6 K/UL    ABS. MONOCYTES 0.5 0.1 - 1.3 K/UL    ABS. EOSINOPHILS 0.2 0.0 - 0.8 K/UL    ABS. BASOPHILS 0.1 0.0 - 0.2 K/UL    ABS. IMM. GRANS. 0.1 0.0 - 0.5 K/UL   METABOLIC PANEL, COMPREHENSIVE    Collection Time: 05/02/17  8:46 PM   Result Value Ref Range    Sodium 143 136 - 145 mmol/L    Potassium 3.6 3.5 - 5.1 mmol/L    Chloride 104 98 - 107 mmol/L    CO2 29 21 - 32 mmol/L    Anion gap 10 7 - 16 mmol/L    Glucose 93 65 - 100 mg/dL    BUN 16 6 - 23 MG/DL    Creatinine 0.96 0.8 - 1.5 MG/DL    GFR est AA >60 >60 ml/min/1.73m2    GFR est non-AA >60 >60 ml/min/1.73m2    Calcium 9.0 8.3 - 10.4 MG/DL    Bilirubin, total 0.7 0.2 - 1.1 MG/DL    ALT (SGPT) 53 12 - 65 U/L    AST (SGOT) 35 15 - 37 U/L    Alk.  phosphatase 92 50 - 136 U/L    Protein, total 7.9 6.3 - 8.2 g/dL    Albumin 4.2 3.5 - 5.0 g/dL    Globulin 3.7 (H) 2.3 - 3.5 g/dL    A-G Ratio 1.1 (L) 1.2 - 3.5     TROPONIN I    Collection Time: 05/02/17  8:46 PM   Result Value Ref Range    Troponin-I, Qt. <0.02 (L) 0.02 - 0.05 NG/ML   EKG, 12 LEAD, INITIAL    Collection Time: 05/02/17 10:00 PM   Result Value Ref Range    Ventricular Rate 59 BPM    Atrial Rate 59 BPM    P-R Interval 160 ms    QRS Duration 94 ms    Q-T Interval 388 ms    QTC Calculation (Bezet) 384 ms    Calculated P Axis 55 degrees    Calculated R Axis 66 degrees    Calculated T Axis 44 degrees    Diagnosis       !! AGE AND GENDER SPECIFIC ECG ANALYSIS !! Sinus bradycardia  Otherwise normal ECG     ETHYL ALCOHOL    Collection Time: 05/02/17 10:23 PM   Result Value Ref Range    ALCOHOL(ETHYL),SERUM <3 MG/DL   DRUG SCREEN, URINE    Collection Time: 05/02/17 11:42 PM   Result Value Ref Range    PCP(PHENCYCLIDINE) NEGATIVE       BENZODIAZEPINE NEGATIVE       COCAINE NEGATIVE       AMPHETAMINE NEGATIVE       METHADONE NEGATIVE       THC (TH-CANNABINOL) NEGATIVE       OPIATES POSITIVE      BARBITURATES NEGATIVE        Ct Head Wo Cont    Result Date: 5/2/2017  CT head without contrast: 05/02/2017 History: Headache, blurred vision. Technique: 5mm axial images were obtained from the skull base to the vertex without intravenous contrast.  Radiation dose reduction techniques were used for this study:  Our CT scanners use one or all of the following: Automated exposure control, adjustment of the mA and/or kVp according to patient's size, iterative reconstruction. Comparison: None Findings: The ventricles and sulci are normal in size and configuration. There are no extra-axial fluid collections. There is no evidence to suggest an acute major territorial infarct. There is no evidence of acute intracranial hemorrhage or mass effect. The bony calvarium is intact. The visualized mastoid air cells and paranasal sinuses are well pneumatized and aerated. Impression: Unremarkable unenhanced CT scan of the brain.        50 yo male with headache and weakness:      Admit for stroke workup

## 2017-05-03 NOTE — DISCHARGE INSTRUCTIONS
DISCHARGE SUMMARY from Nurse    The following personal items are in your possession at time of discharge:    Dental Appliances: None  Visual Aid: None (bifocals at home that do not help)     Home Medications: None  Jewelry: Watch  Clothing: Pants, Shirt, Footwear, Socks, Undergarments  Other Valuables: Cell Phone, Wallet (2 cell phones)             PATIENT INSTRUCTIONS:    After general anesthesia or intravenous sedation, for 24 hours or while taking prescription Narcotics:  · Limit your activities  · Do not drive and operate hazardous machinery  · Do not make important personal or business decisions  · Do  not drink alcoholic beverages  · If you have not urinated within 8 hours after discharge, please contact your surgeon on call. Report the following to your surgeon:  · Excessive pain, swelling, redness or odor of or around the surgical area  · Temperature over 100.5  · Nausea and vomiting lasting longer than 4 hours or if unable to take medications  · Any signs of decreased circulation or nerve impairment to extremity: change in color, persistent  numbness, tingling, coldness or increase pain  · Any questions        What to do at Home:  Recommended activity: Activity as tolerated. If you experience any of the following symptoms shortness of breath, chest pain, or a fever of 100.4 or greater, please follow up with your primary care provider. *  Please give a list of your current medications to your Primary Care Provider. *  Please update this list whenever your medications are discontinued, doses are      changed, or new medications (including over-the-counter products) are added. *  Please carry medication information at all times in case of emergency situations.           These are general instructions for a healthy lifestyle:    No smoking/ No tobacco products/ Avoid exposure to second hand smoke    Surgeon General's Warning:  Quitting smoking now greatly reduces serious risk to your health. Obesity, smoking, and sedentary lifestyle greatly increases your risk for illness    A healthy diet, regular physical exercise & weight monitoring are important for maintaining a healthy lifestyle    You may be retaining fluid if you have a history of heart failure or if you experience any of the following symptoms:  Weight gain of 3 pounds or more overnight or 5 pounds in a week, increased swelling in our hands or feet or shortness of breath while lying flat in bed. Please call your doctor as soon as you notice any of these symptoms; do not wait until your next office visit. Recognize signs and symptoms of STROKE:    F-face looks uneven    A-arms unable to move or move unevenly    S-speech slurred or non-existent    T-time-call 911 as soon as signs and symptoms begin-DO NOT go       Back to bed or wait to see if you get better-TIME IS BRAIN. Warning Signs of HEART ATTACK     Call 911 if you have these symptoms:   Chest discomfort. Most heart attacks involve discomfort in the center of the chest that lasts more than a few minutes, or that goes away and comes back. It can feel like uncomfortable pressure, squeezing, fullness, or pain.  Discomfort in other areas of the upper body. Symptoms can include pain or discomfort in one or both arms, the back, neck, jaw, or stomach.  Shortness of breath with or without chest discomfort.  Other signs may include breaking out in a cold sweat, nausea, or lightheadedness. Don't wait more than five minutes to call 911 - MINUTES MATTER! Fast action can save your life. Calling 911 is almost always the fastest way to get lifesaving treatment. Emergency Medical Services staff can begin treatment when they arrive -- up to an hour sooner than if someone gets to the hospital by car. The discharge information has been reviewed with the patient. The patient verbalized understanding.     Discharge medications reviewed with the patient and appropriate educational materials and side effects teaching were provided. Back Pain: Care Instructions  Your Care Instructions    Back pain has many possible causes. It is often related to problems with muscles and ligaments of the back. It may also be related to problems with the nerves, discs, or bones of the back. Moving, lifting, standing, sitting, or sleeping in an awkward way can strain the back. Sometimes you don't notice the injury until later. Arthritis is another common cause of back pain. Although it may hurt a lot, back pain usually improves on its own within several weeks. Most people recover in 12 weeks or less. Using good home treatment and being careful not to stress your back can help you feel better sooner. Follow-up care is a key part of your treatment and safety. Be sure to make and go to all appointments, and call your doctor if you are having problems. Its also a good idea to know your test results and keep a list of the medicines you take. How can you care for yourself at home? · Sit or lie in positions that are most comfortable and reduce your pain. Try one of these positions when you lie down:  ¨ Lie on your back with your knees bent and supported by large pillows. ¨ Lie on the floor with your legs on the seat of a sofa or chair. Major Evelyn on your side with your knees and hips bent and a pillow between your legs. ¨ Lie on your stomach if it does not make pain worse. · Do not sit up in bed, and avoid soft couches and twisted positions. Bed rest can help relieve pain at first, but it delays healing. Avoid bed rest after the first day of back pain. · Change positions every 30 minutes. If you must sit for long periods of time, take breaks from sitting. Get up and walk around, or lie in a comfortable position. · Try using a heating pad on a low or medium setting for 15 to 20 minutes every 2 or 3 hours. Try a warm shower in place of one session with the heating pad.   · You can also try an ice pack for 10 to 15 minutes every 2 to 3 hours. Put a thin cloth between the ice pack and your skin. · Take pain medicines exactly as directed. ¨ If the doctor gave you a prescription medicine for pain, take it as prescribed. ¨ If you are not taking a prescription pain medicine, ask your doctor if you can take an over-the-counter medicine. · Take short walks several times a day. You can start with 5 to 10 minutes, 3 or 4 times a day, and work up to longer walks. Walk on level surfaces and avoid hills and stairs until your back is better. · Return to work and other activities as soon as you can. Continued rest without activity is usually not good for your back. · To prevent future back pain, do exercises to stretch and strengthen your back and stomach. Learn how to use good posture, safe lifting techniques, and proper body mechanics. When should you call for help? Call your doctor now or seek immediate medical care if:  · You have new or worsening numbness in your legs. · You have new or worsening weakness in your legs. (This could make it hard to stand up.)  · You lose control of your bladder or bowels. Watch closely for changes in your health, and be sure to contact your doctor if:  · Your pain gets worse. · You are not getting better after 2 weeks. Where can you learn more? Go to http://sahra-shekhar.info/. Enter W656 in the search box to learn more about \"Back Pain: Care Instructions. \"  Current as of: May 23, 2016  Content Version: 11.2  © 8567-2443 [employer]. Care instructions adapted under license by [employer] (which disclaims liability or warranty for this information). If you have questions about a medical condition or this instruction, always ask your healthcare professional. [address] any warranty or liability for your use of this information.        Hypothyroidism: Care Instructions  Your Care Instructions  You have hypothyroidism, which means that your body is not making enough thyroid hormone. This hormone helps your body use energy. If your thyroid level is low, you may feel tired, be constipated, have an increase in your blood pressure, or have dry skin or memory problems. You may also get cold easily, even when it is warm. Women with low thyroid levels may have heavy menstrual periods. A blood test to find your thyroid-stimulating hormone (TSH) level is used to check for hypothyroidism. A high TSH level may mean that you have low thyroid. When your body is not making enough thyroid hormone, TSH levels rise in an effort to make the body produce more. The treatment for hypothyroidism is to take thyroid hormone pills. You should start to feel better in 1 to 2 weeks. But it can take several months to see changes in the TSH level. You will need regular visits with your doctor to make sure you have the right dose of medicine. Most people need treatment for the rest of their lives. You will need to see your doctor regularly to have blood tests and to make sure you are doing well. Follow-up care is a key part of your treatment and safety. Be sure to make and go to all appointments, and call your doctor if you are having problems. Its also a good idea to know your test results and keep a list of the medicines you take. How can you care for yourself at home? · Take your thyroid hormone medicine exactly as prescribed. Call your doctor if you think you are having a problem with your medicine. Most people do not have side effects if they take the right amount of medicine regularly. ¨ Take the medicine 30 minutes before breakfast, and do not take it with calcium, vitamins, or iron. ¨ Do not take extra doses of your thyroid medicine. It will not help you get better any faster, and it may cause side effects. ¨ If you forget to take a dose, do NOT take a double dose of medicine. Take your usual dose the next day.   · Tell your doctor about all prescription, herbal, or over-the-counter products you take. · Take care of yourself. Eat a healthy diet, get enough sleep, and get regular exercise. When should you call for help? Call 911 anytime you think you may need emergency care. For example, call if:  · You passed out (lost consciousness). · You have severe trouble breathing. · You have a very slow heartbeat (less than 60 beats a minute). · You have a low body temperature (95°F or below). Call your doctor now or seek immediate medical care if:  · You feel tired, sluggish, or weak. · You have trouble remembering things or concentrating. · You do not begin to feel better 2 weeks after starting your medicine. Watch closely for changes in your health, and be sure to contact your doctor if you have any problems. Where can you learn more? Go to http://sahra-shekhar.info/. Enter O204 in the search box to learn more about \"Hypothyroidism: Care Instructions. \"  Current as of: July 28, 2016  Content Version: 11.2  © 2970-1486 [employer]. Care instructions adapted under license by [employer] (which disclaims liability or warranty for this information). If you have questions about a medical condition or this instruction, always ask your healthcare professional. [address] any warranty or liability for your use of this information.

## 2017-05-03 NOTE — PROGRESS NOTES
Shift assessment completed. Pt. Alert and oriented x4. Room air. No acute distress noted.  equal bilaterally. Able to follow commands. No deficits noted. No pain at this time. Numbness noted in left arm. Instructed to call for assistance. Oriented to room and call light. Call light in reach.

## 2017-05-03 NOTE — ED NOTES
TRANSFER - OUT REPORT:    Verbal report given to Muriel(name) on [facility]  being transferred to [facility](unit) for routine progression of care       Report consisted of patients Situation, Background, Assessment and   Recommendations(SBAR). Information from the following report(s) ED Summary was reviewed with the receiving nurse. Lines:   Peripheral IV 05/03/17 Left Antecubital (Active)   Site Assessment Clean, dry, & intact 5/3/2017 12:35 AM   Phlebitis Assessment 0 5/3/2017 12:35 AM   Infiltration Assessment 0 5/3/2017 12:35 AM   Dressing Status Clean, dry, & intact 5/3/2017 12:35 AM   Hub Color/Line Status Green 5/3/2017 12:35 AM        Opportunity for questions and clarification was provided.       Patient transported with:   [facility] [employer] [facility] [facility]
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
## 2017-05-03 NOTE — PROGRESS NOTES
Emailed the [facility] to assign pt a pcp. SW following for dc needs.     Addendum  Pt [facility] at 10:10 am
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
## 2017-05-03 NOTE — PROGRESS NOTES
Discharge instructions and prescriptions given and explained to pt using a  through telephone. Pt verbalized understanding. Medication side effects sheet reviewed with pt. Pt to be discharged home, waiting on his wife (jd).

## 2017-05-03 NOTE — PROGRESS NOTES
Problem: Mobility Impaired (Adult and Pediatric)  Goal: *Acute Goals and Plan of Care (Insert Text)  DISCHARGE GOALS:  (1.)Mr. Mancuso will move from supine to sit and sit to supine , scoot up and down and roll side to side with INDEPENDENT within 1 day(s). GOAL MET 5/3/2017   (2.)Mr. Mancuso will transfer from bed to chair and chair to bed with INDEPENDENT using the least restrictive device within 1 day(s). GOAL MET 5/3/2017   (3.)Mr. Mancuso will ambulate with INDEPENDENT for 300 feet with the least restrictive device within 1 day(s). GOAL MET 5/3/2017       PHYSICAL THERAPY: Initial Assessment, Discharge, Treatment Day: Day of Assessment and AM 5/3/2017  OBSERVATION: Hospital Day: 2  Payor: Elkin Santillan / Plan: [address] / Product Type: PPO /      NAME/AGE/GENDER: Angie Borja is a 49 y.o. male         PRIMARY DIAGNOSIS: severe headache Headache Headache        ICD-10: Treatment Diagnosis:       · Generalized Muscle Weakness (M62.81)  · Difficulty in walking, Not elsewhere classified (R26.2)   Precaution/Allergies:  Review of patient's allergies indicates no known allergies. ASSESSMENT:      Mr. Ayad Hopkins presents supine in bed, agreeable to therapy. 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 He lives with his wife and children in a trailer. He has hx of previous RTC Sx L UE affecting functional ROM and strength. Decreased  strength and tingling L UE. He stood and donned cowboy boots and ambulated 300 feet in hallway with no balance deficits or deviations. All goals met--will DC from acute caseload. Thank you for this referral.                            RECOMMENDED REHABILITATION/EQUIPMENT: (at time of discharge pending progress): Continue Skilled Therapy.                    HISTORY:   History of Present Injury/Illness (Reason for Referral):  See assessment  Past Medical History/Comorbidities:   Mr. Ayad Hopkins  has no past medical history on file. Mr. Dang Daniel  has no past surgical history on file. Social History/Living Environment:   Home Environment: Trailer/mobile home  # Steps to Enter: 5  One/Two Story Residence: One story  Living Alone: No  Support Systems: Spouse/Significant Other/Partner, Family member(s), Child(aroldo)  Patient Expects to be Discharged to[de-identified] Trailer/mobile home  Current DME Used/Available at Home: None  Prior Level of Function/Work/Activity:  See assessment      Number of Personal Factors/Comorbidities that affect the Plan of Care: 0: LOW COMPLEXITY   EXAMINATION:   Most Recent Physical Functioning:   Gross Assessment:  AROM: Within functional limits (L UE limited due to previous RTC surgery last year (?) )  PROM: Within functional limits  Strength: Within functional limits  Coordination: Within functional limits  Tone: Normal  Sensation: Intact               Posture:  Posture (WDL): Exceptions to WDL  Posture Assessment: Forward head  Balance:  Sitting: Intact  Standing: Intact Bed Mobility:  Rolling: Independent  Supine to Sit: Independent  Scooting: Independent  Wheelchair Mobility:     Transfers:  Sit to Stand: Independent  Stand to Sit: Independent  Bed to Chair: Independent  Gait:     Distance (ft): 300 Feet (ft)  Ambulation - Level of Assistance: Independent       Body Structures Involved:  1. Bones  2. Joints  3. Muscles  4. Ligaments Body Functions Affected:  1. Sensory/Pain  2. Neuromusculoskeletal  3. Movement Related Activities and Participation Affected:  1. Mobility  2. Self Care   Number of elements that affect the Plan of Care: 1-2: LOW COMPLEXITY   CLINICAL PRESENTATION:   Presentation: Stable and uncomplicated: LOW COMPLEXITY   CLINICAL DECISION MAKIN [facility] [address] [id number] AM-PAC 6 Clicks   Basic Mobility Inpatient Short Form  How much difficulty does the patient currently have. .. Unable A Lot A Little None   1.   Turning over in bed (including adjusting bedclothes, sheets and blankets)? [ ] 1   [ ] 2   [ ] 3   [X] 4   2. Sitting down on and standing up from a chair with arms ( e.g., wheelchair, bedside commode, etc.)   [ ] 1   [ ] 2   [ ] 3   [X] 4   3. Moving from lying on back to sitting on the side of the bed? [ ] 1   [ ] 2   [ ] 3   [X] 4   How much help from another person does the patient currently need. .. Total A Lot A Little None   4. Moving to and from a bed to a chair (including a wheelchair)? [ ] 1   [ ] 2   [ ] 3   [X] 4   5. Need to walk in hospital room? [ ] 1   [ ] 2   [ ] 3   [X] 4   6. Climbing 3-5 steps with a railing? [ ] 1   [ ] 2   [ ] 3   [X] 4   © 2007, Trustees of [address] [place] [address], under license to [employer]. All rights reserved    Score:  Initial: 24 Most Recent: X (Date: -- )     Interpretation of Tool:  Represents activities that are increasingly more difficult (i.e. Bed mobility, Transfers, Gait). Score 24 23 22-20 19-15 14-10 9-7 6       Modifier CH CI CJ CK CL CM CN         · Mobility - Walking and Moving Around:               - CURRENT STATUS:    CH - 0% impaired, limited or restricted               - GOAL STATUS:           CH - 0% impaired, limited or restricted               - D/C STATUS:                       CH - 0% impaired, limited or restricted  Payor: BLUE CROSS / Plan: [place] [facility] / Product Type: PPO /       Medical Necessity:     · DC. Use of outcome tool(s) and clinical judgement create a POC that gives a: Clear prediction of patient's progress: LOW COMPLEXITY                 TREATMENT:   (In addition to Assessment/Re-Assessment sessions the following treatments were rendered)   Pre-treatment Symptoms/Complaints:  [facility] verbalized understanding of role of PT and POC. Pain: Initial:   Pain Location 1: Arm  Pain Orientation 1: Left  Pain Intervention(s) 1: Ambulation/Increased Activity  Post Session:  0      Therapeutic Activity: (    9 minutes):   Therapeutic activities including Bed transfers, Chair transfers and Ambulation on level ground to improve mobility, strength, balance and coordination. Required no   to promote static and dynamic balance in standing. Braces/Orthotics/Lines/Etc:   · O2 Device: Room air  Treatment/Session Assessment:    · Response to Treatment:  [facility] verbalized understanding of role of PT and POC.   ·   · Interdisciplinary Collaboration:  · Physical Therapist  · Registered Nurse  · After treatment position/precautions:  · Up in chair  · Bed/Chair-wheels locked  · Bed in low position  · Call light within reach  · RN notified  · Family at bedside  · dc  Total Treatment Duration:  PT Patient Time In/Time Out  Time In: 0926  Time Out: [address] Donta Paige DPT

## 2017-05-03 NOTE — DISCHARGE SUMMARY
Hospitalist Discharge Summary     Patient ID:  Dior Lewis [facility]  [id number]  31 y.o.  1970  Admit date: 5/2/2017  9:11 PM  Discharge date and time: 5/3/2017  Attending: Rosalva Venegas MD  PCP:  None  Treatment Team: Attending Provider: Rosalva Venegas MD; Utilization Review: Sandra Dick RN; Care Manager: Norma Soria [address] [facility] Diagnosis Headache   Hospital Problems as of 5/3/2017  Never Reviewed          Codes Class Noted - Resolved POA    * (Principal)Headache ICD-10-CM: R51  ICD-9-CM: 784.0  5/3/2017 - Present Yes        Blurred vision, bilateral ICD-10-CM: H53.8  ICD-9-CM: 368.8  5/3/2017 - Present Yes        Neck pain ICD-10-CM: M54.2  ICD-9-CM: 723.1  5/3/2017 - Present Yes              HPI:  49 y.o. male who presents to the ER with blurred vision headache and left arm weakness.  present. No recent injury but does have previous head trauma at work when fell and hit head without a hard hat. He had neurosurgical consultation at that time and no acute pathology. Denies syncope, chest pain, palpitations, F/C. Has had left shoulder surgery before. No recent heavy lifting. States this HA is worse than ones he has had in the past but denies worst HA ever. No hx TIA/CVA or seizures. Hospital Course: Admitted overnight with worsening HA. Has been having HA for 3 years since an accident where he was flung off a machine onto his head (no hard hat) and L shoulder. Since then, his shoulder was repaired, but he continues to have residual L hand weakness and L arm pain. He reports frequent HAs and L sided neck pain. MRI with white matter changes that can be consistent with migraines. Also with possible cavernous angioma, which is considered benign but can cause HAs. Some improvement in pain with muscle relaxants. During workup, found to have elevated LDL and elevated TSH. Started on both statin and synthroid (will need follow up TFTs in 2-4 weeks).  SW also set up for him to establish care with PCP, so that his pain issues can continue to be addressed. Significant Diagnostic Studies:   Labs: Results:       Chemistry Recent Labs      05/02/17 2046   GLU  93   NA  143   K  3.6   CL  104   CO2  29   BUN  16   CREA  0.96   CA  9.0   AGAP  10   AP  92   TP  7.9   ALB  4.2   GLOB  3.7*   AGRAT  1.1*      CBC w/Diff Recent Labs      05/02/17 2046   WBC  7.9   RBC  5.15   HGB  15.8   HCT  44.6   PLT  281   GRANS  36*   LYMPH  52*   EOS  2      Cardiac Enzymes No results for input(s): CPK, CKND1, SID in the last 72 hours. No lab exists for component: CKRMB, TROIP   Coagulation No results for input(s): PTP, INR, APTT in the last 72 hours. No lab exists for component: INREXT, INREXT    Lipid Panel Lab Results   Component Value Date/Time    Cholesterol, total 199 05/03/2017 05:15 AM    HDL Cholesterol 32 05/03/2017 05:15 AM    LDL, calculated 136.4 05/03/2017 05:15 AM    VLDL, calculated 30.6 05/03/2017 05:15 AM    Triglyceride 153 05/03/2017 05:15 AM    CHOL/HDL Ratio 6.2 05/03/2017 05:15 AM      BNP No results for input(s): BNPP in the last 72 hours. Liver Enzymes Recent Labs      05/02/17 2046   TP  7.9   ALB  4.2   AP  92   SGOT  35      Thyroid Studies Lab Results   Component Value Date/Time    TSH 27.400 05/03/2017 05:15 AM          CTA HEAD NECK W WO CONT   Final Result   IMPRESSION: No significant arterial pathology. MRI BRAIN W WO CONT   Final Result   Impression:    1. No evidence of acute infarction. 2. Scattered punctate foci of T2 hyperintensity within the supratentorial white   matter. These are nonspecific findings. Please see above discussion. 3. Left cerebellar developmental venous anomaly with additional areas of more   discrete signal hypointensity within the left cerebellum suggesting hemosiderin   staining, one or both of which could represent coexisting cavernous angiomas.              XR SPINE CERV 4 OR 5 V   Final Result   IMPRESSION: Mild degenerative disc disease at C4-C5, C5-C6, and C6-C7. CT HEAD WO CONT   Final Result   Impression: Unremarkable unenhanced CT scan of the brain. Discharge Exam:  Visit Vitals    /77    Pulse (!) 57    Temp 98.1 °F (36.7 °C)    Resp 18    Ht 5' 4\" (1.626 m)    Wt 79.4 kg (175 lb)    SpO2 98%    BMI 30.04 kg/m2     General appearance: alert, cooperative, NAD  Lungs: clear to auscultation bilaterally, normal resp effort  Heart: mild umm  Abdomen: soft, NTTP  Extremities: no cyanosis or edema  Neurologic: mild L hand weakness, CN intact    Disposition: home  Discharge Condition: stable  Patient Instructions:   Current Discharge Medication List      START taking these medications    Details   atorvastatin (LIPITOR) 40 mg tablet Take 1 Tab by mouth nightly. Qty: 30 Tab, Refills: 1      levothyroxine (SYNTHROID) 50 mcg tablet Take 1 Tab by mouth Daily (before breakfast). Qty: 30 Tab, Refills: 1      metaxalone (SKELAXIN) 800 mg tablet Take 1 Tab by mouth three (3) times daily as needed for up to 10 days. Indications: MUSCLE SPASM  Qty: 60 Tab, Refills: 1         CONTINUE these medications which have NOT CHANGED    Details   acetaminophen (TYLENOL) 325 mg tablet Take 325 mg by mouth every four (4) hours as needed for Pain. ibuprofen 100 mg tablet Take 100 mg by mouth every six (6) hours as needed for Pain.              Activity: Activity as tolerated  Diet: Regular Diet    Follow-up  -  Establish PCP in 1-2 weeks; needs TFTs repeated in 2-4 weeks    Signed:  Cristóbal Perez MD  5/3/2017  5:39 PM

## 2017-05-17 PROBLEM — M25.512 CHRONIC LEFT SHOULDER PAIN: Status: ACTIVE | Noted: 2017-05-17

## 2017-05-17 PROBLEM — G89.29 CHRONIC LEFT SHOULDER PAIN: Status: ACTIVE | Noted: 2017-05-17

## 2017-05-17 PROBLEM — E78.5 HYPERLIPIDEMIA: Status: ACTIVE | Noted: 2017-05-17

## 2017-05-17 PROBLEM — R29.898 LEFT ARM WEAKNESS: Status: ACTIVE | Noted: 2017-05-17

## 2017-05-17 PROBLEM — E03.9 HYPOTHYROIDISM: Status: ACTIVE | Noted: 2017-05-17

## 2017-06-07 PROBLEM — D18.02 CAVERNOUS HEMANGIOMA OF BRAIN (HCC): Status: ACTIVE | Noted: 2017-06-07

## 2017-07-07 PROBLEM — G44.041 INTRACTABLE CHRONIC PAROXYSMAL HEMICRANIA: Status: ACTIVE | Noted: 2017-07-07
